# Patient Record
Sex: MALE | Race: WHITE | Employment: FULL TIME | URBAN - METROPOLITAN AREA
[De-identification: names, ages, dates, MRNs, and addresses within clinical notes are randomized per-mention and may not be internally consistent; named-entity substitution may affect disease eponyms.]

---

## 2020-09-13 ENCOUNTER — HOSPITAL ENCOUNTER (INPATIENT)
Age: 55
LOS: 1 days | Discharge: HOME | DRG: 066 | End: 2020-09-14
Attending: EMERGENCY MEDICINE | Admitting: INTERNAL MEDICINE
Payer: COMMERCIAL

## 2020-09-13 ENCOUNTER — APPOINTMENT (OUTPATIENT)
Dept: CT IMAGING | Age: 55
DRG: 066 | End: 2020-09-13
Payer: COMMERCIAL

## 2020-09-13 PROBLEM — I63.9 ACUTE CVA (CEREBROVASCULAR ACCIDENT) (HCC): Status: ACTIVE | Noted: 2020-09-13

## 2020-09-13 LAB
ABSOLUTE EOS #: 0.2 K/UL (ref 0–0.4)
ABSOLUTE IMMATURE GRANULOCYTE: ABNORMAL K/UL (ref 0–0.3)
ABSOLUTE LYMPH #: 2.5 K/UL (ref 1–4.8)
ABSOLUTE MONO #: 0.8 K/UL (ref 0.1–1.3)
ALBUMIN SERPL-MCNC: 4.2 G/DL (ref 3.5–5.2)
ALBUMIN/GLOBULIN RATIO: ABNORMAL (ref 1–2.5)
ALP BLD-CCNC: 57 U/L (ref 40–129)
ALT SERPL-CCNC: 35 U/L (ref 5–41)
ANION GAP SERPL CALCULATED.3IONS-SCNC: 9 MMOL/L (ref 9–17)
AST SERPL-CCNC: 22 U/L
BASOPHILS # BLD: 1 % (ref 0–2)
BASOPHILS ABSOLUTE: 0 K/UL (ref 0–0.2)
BILIRUB SERPL-MCNC: 0.77 MG/DL (ref 0.3–1.2)
BUN BLDV-MCNC: 22 MG/DL (ref 6–20)
BUN/CREAT BLD: ABNORMAL (ref 9–20)
CALCIUM SERPL-MCNC: 9.6 MG/DL (ref 8.6–10.4)
CHLORIDE BLD-SCNC: 107 MMOL/L (ref 98–107)
CO2: 23 MMOL/L (ref 20–31)
CREAT SERPL-MCNC: 0.8 MG/DL (ref 0.7–1.2)
DIFFERENTIAL TYPE: ABNORMAL
EOSINOPHILS RELATIVE PERCENT: 2 % (ref 0–4)
ESTIMATED AVERAGE GLUCOSE: 123 MG/DL
GFR AFRICAN AMERICAN: >60 ML/MIN
GFR NON-AFRICAN AMERICAN: >60 ML/MIN
GFR NON-AFRICAN AMERICAN: >60 ML/MIN
GFR SERPL CREATININE-BSD FRML MDRD: >60 ML/MIN
GFR SERPL CREATININE-BSD FRML MDRD: ABNORMAL ML/MIN/{1.73_M2}
GFR SERPL CREATININE-BSD FRML MDRD: ABNORMAL ML/MIN/{1.73_M2}
GFR SERPL CREATININE-BSD FRML MDRD: NORMAL ML/MIN/{1.73_M2}
GLUCOSE BLD-MCNC: 103 MG/DL
GLUCOSE BLD-MCNC: 103 MG/DL
GLUCOSE BLD-MCNC: 103 MG/DL (ref 75–110)
GLUCOSE BLD-MCNC: 109 MG/DL (ref 70–99)
HBA1C MFR BLD: 5.9 % (ref 4–6)
HCT VFR BLD CALC: 46.9 % (ref 41–53)
HEMOGLOBIN: 16.7 G/DL (ref 13.5–17.5)
IMMATURE GRANULOCYTES: ABNORMAL %
INR BLD: 1
LYMPHOCYTES # BLD: 31 % (ref 24–44)
MCH RBC QN AUTO: 31.1 PG (ref 26–34)
MCHC RBC AUTO-ENTMCNC: 35.5 G/DL (ref 31–37)
MCV RBC AUTO: 87.6 FL (ref 80–100)
MONOCYTES # BLD: 9 % (ref 1–7)
NRBC AUTOMATED: ABNORMAL PER 100 WBC
PDW BLD-RTO: 14.2 % (ref 11.5–14.9)
PLATELET # BLD: 168 K/UL (ref 150–450)
PLATELET ESTIMATE: ABNORMAL
PMV BLD AUTO: 9.2 FL (ref 6–12)
POC CREATININE: 0.91 MG/DL (ref 0.51–1.19)
POTASSIUM SERPL-SCNC: 4.1 MMOL/L (ref 3.7–5.3)
PROTHROMBIN TIME: 13.4 SEC (ref 11.8–14.6)
RBC # BLD: 5.35 M/UL (ref 4.5–5.9)
RBC # BLD: ABNORMAL 10*6/UL
SEG NEUTROPHILS: 57 % (ref 36–66)
SEGMENTED NEUTROPHILS ABSOLUTE COUNT: 4.7 K/UL (ref 1.3–9.1)
SODIUM BLD-SCNC: 139 MMOL/L (ref 135–144)
TOTAL PROTEIN: 7.3 G/DL (ref 6.4–8.3)
TROPONIN INTERP: NORMAL
TROPONIN T: NORMAL NG/ML
TROPONIN, HIGH SENSITIVITY: 9 NG/L (ref 0–22)
WBC # BLD: 8.2 K/UL (ref 3.5–11)
WBC # BLD: ABNORMAL 10*3/UL

## 2020-09-13 PROCEDURE — 2060000000 HC ICU INTERMEDIATE R&B

## 2020-09-13 PROCEDURE — 99222 1ST HOSP IP/OBS MODERATE 55: CPT | Performed by: PSYCHIATRY & NEUROLOGY

## 2020-09-13 PROCEDURE — 70450 CT HEAD/BRAIN W/O DYE: CPT

## 2020-09-13 PROCEDURE — 99254 IP/OBS CNSLTJ NEW/EST MOD 60: CPT | Performed by: PSYCHIATRY & NEUROLOGY

## 2020-09-13 PROCEDURE — 2580000003 HC RX 258: Performed by: INTERNAL MEDICINE

## 2020-09-13 PROCEDURE — 85610 PROTHROMBIN TIME: CPT

## 2020-09-13 PROCEDURE — 99223 1ST HOSP IP/OBS HIGH 75: CPT | Performed by: INTERNAL MEDICINE

## 2020-09-13 PROCEDURE — 85025 COMPLETE CBC W/AUTO DIFF WBC: CPT

## 2020-09-13 PROCEDURE — 83036 HEMOGLOBIN GLYCOSYLATED A1C: CPT

## 2020-09-13 PROCEDURE — 84484 ASSAY OF TROPONIN QUANT: CPT

## 2020-09-13 PROCEDURE — 6360000002 HC RX W HCPCS: Performed by: INTERNAL MEDICINE

## 2020-09-13 PROCEDURE — 99285 EMERGENCY DEPT VISIT HI MDM: CPT

## 2020-09-13 PROCEDURE — 36415 COLL VENOUS BLD VENIPUNCTURE: CPT

## 2020-09-13 PROCEDURE — 82947 ASSAY GLUCOSE BLOOD QUANT: CPT

## 2020-09-13 PROCEDURE — 80053 COMPREHEN METABOLIC PANEL: CPT

## 2020-09-13 PROCEDURE — 6360000004 HC RX CONTRAST MEDICATION: Performed by: EMERGENCY MEDICINE

## 2020-09-13 PROCEDURE — 93005 ELECTROCARDIOGRAM TRACING: CPT | Performed by: EMERGENCY MEDICINE

## 2020-09-13 PROCEDURE — 2580000003 HC RX 258: Performed by: EMERGENCY MEDICINE

## 2020-09-13 PROCEDURE — 70496 CT ANGIOGRAPHY HEAD: CPT

## 2020-09-13 PROCEDURE — 6370000000 HC RX 637 (ALT 250 FOR IP): Performed by: EMERGENCY MEDICINE

## 2020-09-13 PROCEDURE — 82565 ASSAY OF CREATININE: CPT

## 2020-09-13 RX ORDER — SODIUM CHLORIDE 0.9 % (FLUSH) 0.9 %
10 SYRINGE (ML) INJECTION EVERY 12 HOURS SCHEDULED
Status: DISCONTINUED | OUTPATIENT
Start: 2020-09-13 | End: 2020-09-14 | Stop reason: HOSPADM

## 2020-09-13 RX ORDER — ACETAMINOPHEN 325 MG/1
650 TABLET ORAL EVERY 4 HOURS PRN
Status: DISCONTINUED | OUTPATIENT
Start: 2020-09-13 | End: 2020-09-14 | Stop reason: HOSPADM

## 2020-09-13 RX ORDER — SODIUM CHLORIDE 0.9 % (FLUSH) 0.9 %
10 SYRINGE (ML) INJECTION PRN
Status: DISCONTINUED | OUTPATIENT
Start: 2020-09-13 | End: 2020-09-14 | Stop reason: HOSPADM

## 2020-09-13 RX ORDER — ATORVASTATIN CALCIUM 40 MG/1
40 TABLET, FILM COATED ORAL NIGHTLY
Status: DISCONTINUED | OUTPATIENT
Start: 2020-09-14 | End: 2020-09-14 | Stop reason: HOSPADM

## 2020-09-13 RX ORDER — CLOPIDOGREL BISULFATE 75 MG/1
300 TABLET ORAL ONCE
Status: COMPLETED | OUTPATIENT
Start: 2020-09-13 | End: 2020-09-13

## 2020-09-13 RX ORDER — CLOPIDOGREL BISULFATE 75 MG/1
75 TABLET ORAL DAILY
Status: DISCONTINUED | OUTPATIENT
Start: 2020-09-14 | End: 2020-09-14 | Stop reason: HOSPADM

## 2020-09-13 RX ORDER — SODIUM CHLORIDE 9 MG/ML
INJECTION, SOLUTION INTRAVENOUS CONTINUOUS
Status: DISCONTINUED | OUTPATIENT
Start: 2020-09-13 | End: 2020-09-14 | Stop reason: HOSPADM

## 2020-09-13 RX ORDER — ATORVASTATIN CALCIUM 80 MG/1
80 TABLET, FILM COATED ORAL ONCE
Status: COMPLETED | OUTPATIENT
Start: 2020-09-13 | End: 2020-09-13

## 2020-09-13 RX ORDER — 0.9 % SODIUM CHLORIDE 0.9 %
1000 INTRAVENOUS SOLUTION INTRAVENOUS ONCE
Status: COMPLETED | OUTPATIENT
Start: 2020-09-13 | End: 2020-09-13

## 2020-09-13 RX ORDER — ASPIRIN 81 MG/1
324 TABLET, CHEWABLE ORAL ONCE
Status: COMPLETED | OUTPATIENT
Start: 2020-09-13 | End: 2020-09-13

## 2020-09-13 RX ORDER — 0.9 % SODIUM CHLORIDE 0.9 %
80 INTRAVENOUS SOLUTION INTRAVENOUS ONCE
Status: COMPLETED | OUTPATIENT
Start: 2020-09-13 | End: 2020-09-13

## 2020-09-13 RX ADMIN — Medication 10 ML: at 11:22

## 2020-09-13 RX ADMIN — CLOPIDOGREL BISULFATE 300 MG: 75 TABLET ORAL at 09:49

## 2020-09-13 RX ADMIN — IOVERSOL 75 ML: 741 INJECTION INTRA-ARTERIAL; INTRAVENOUS at 09:15

## 2020-09-13 RX ADMIN — ENOXAPARIN SODIUM 30 MG: 30 INJECTION SUBCUTANEOUS at 21:51

## 2020-09-13 RX ADMIN — SODIUM CHLORIDE: 9 INJECTION, SOLUTION INTRAVENOUS at 22:03

## 2020-09-13 RX ADMIN — ASPIRIN 324 MG: 81 TABLET, CHEWABLE ORAL at 09:53

## 2020-09-13 RX ADMIN — SODIUM CHLORIDE 1000 ML: 9 INJECTION, SOLUTION INTRAVENOUS at 09:54

## 2020-09-13 RX ADMIN — ATORVASTATIN CALCIUM 80 MG: 80 TABLET, FILM COATED ORAL at 09:49

## 2020-09-13 RX ADMIN — ENOXAPARIN SODIUM 30 MG: 30 INJECTION SUBCUTANEOUS at 11:22

## 2020-09-13 RX ADMIN — SODIUM CHLORIDE: 9 INJECTION, SOLUTION INTRAVENOUS at 11:22

## 2020-09-13 RX ADMIN — SODIUM CHLORIDE 80 ML: 9 INJECTION, SOLUTION INTRAVENOUS at 09:15

## 2020-09-13 ASSESSMENT — ENCOUNTER SYMPTOMS
CHEST TIGHTNESS: 0
EYE PAIN: 0
CONSTIPATION: 0
FACIAL SWELLING: 0
DIARRHEA: 0
SHORTNESS OF BREATH: 0
WHEEZING: 0
COLOR CHANGE: 0
SINUS PRESSURE: 0
NAUSEA: 0
VOMITING: 0
BLOOD IN STOOL: 0
EYE REDNESS: 0
ABDOMINAL PAIN: 0
COUGH: 0
BACK PAIN: 0
EYE DISCHARGE: 0
SORE THROAT: 0
TROUBLE SWALLOWING: 0
RHINORRHEA: 0

## 2020-09-13 ASSESSMENT — PAIN DESCRIPTION - PAIN TYPE: TYPE: ACUTE PAIN

## 2020-09-13 ASSESSMENT — PAIN SCALES - GENERAL
PAINLEVEL_OUTOF10: 0
PAINLEVEL_OUTOF10: 3

## 2020-09-13 ASSESSMENT — PAIN DESCRIPTION - LOCATION: LOCATION: NECK

## 2020-09-13 NOTE — LETTER
[address]  Phone: [phone]    No name on file. September 14, 2020     Patient: Primitivo Dunham   YOB: 1965   Date of Visit: 9/13/2020       To Whom It May Concern: It is my medical opinion that Primitivo Dunham may return to work on 9/15/2020 with light duty until 9/16/2020. Pt may return to full duty on 9/17/2020.     If you have any questions or concerns, please don't hesitate to call [phone]    Sincerely,    Dr. Lau Reef

## 2020-09-13 NOTE — ED NOTES
Patient returned from [address] on stretcher on monitor with this RN.  Telestroke initiated with Dr. Jhony Jaeger, NAM  09/13/20 5613

## 2020-09-13 NOTE — CONSULTS
[facility], [address], [address] [place] [address]  Ph: [phone] or [phone]  FAX: [phone]        Reason for consult: Possible stroke    I had the pleasure of seeing your patient in neurology consultation for his symptoms. As you would recall Benito Lazcano is a 63 y.o. yo male admitted on 9/13/2020. The patient was at his baseline when he went to sleep on 9/12/2020 around 10 PM, he woke up around 5:30 AM and while going to the bathroom, he noticed that his gait imbalance then he has to walk right steppage to prevent falling. He did not have any focal weakness, no dizziness, no diplopia, no nausea vomiting. He came to the hospital as a stroke alert and underwent CT scan of the head that showed left cerebellar hypodensity suggestive for subacute stroke. CT angiogram head neck was negative. The patient denies having any. History of stroke or TIA. On outpatient basis, he does not take any antiplatelet medications. He has a history of \"white coat hypertension\" however does not take any medications. The patient also has been told that he has borderline hyperlipidemia but does not take a medication for that. History reviewed. No pertinent past medical history.   Past Surgical History:   Procedure Laterality Date    BACK SURGERY       Social History     Socioeconomic History    Marital status: Single     Spouse name: Not on file    Number of children: Not on file    Years of education: Not on file    Highest education level: Not on file   Occupational History    Not on file   Social Needs    Financial resource strain: Not on file    Food insecurity     Worry: Not on file     Inability: Not on file    Transportation needs     Medical: Not on file     Non-medical: Not on file   Tobacco Use    Smoking status: Never Smoker    Smokeless tobacco: Never Used   Substance and Sexual Activity    Alcohol use: Not Currently    Drug use: Never    Sexual intact memory with no confusion, speech or language problems; no hallucinations or delusions     Cranial nerves   II - visual fields intact to confrontation                                                III, IV, VI - extra-ocular muscles full: no pupillary defect; no ELI, no nystagmus, no ptosis                                                                      V - normal facial sensation                                                               VII - normal facial symmetry                                                             VIII - intact hearing                                                                             IX, X - symmetrical palate                                                                  XI - symmetrical shoulder shrug                                                       XII - midline tongue without atrophy or fasciculation     Motor function  Normal muscle bulk and tone; normal power 5/5, including fine motor movements     Sensory function Intact to touch, pin, vibration, proprioception     Cerebellar Intact fine motor movement. No involuntary movements or tremors     Reflex function Intact 2+ DTR and symmetric. Negative Babinski     Gait                  Not tested         No results found for: LDLCALC, LDLCHOLESTEROL, LDLDIRECT  No components found for: CHLPL  No results found for: TRIG  No results found for: HDL  No results found for: LDLCALC  No results found for: LABVLDL  No results found for: LABA1C  No results found for: EAG  No results found for: [id number]   Neurological work up:  CT head    CTA head and neck  MRI brain   2 D echo     Assessment and Recommendations:   Possible left cerebellar subacute ischemic stroke    I agree with continued aspirin along with Plavix 75 mg a day for 3 weeks and then long-term aspirin 81 mg a day thereafter. Continue Lipitor 40 mg p.o. nightly  MRI scan of the brain and 2D echo.   If 2D echo is negative, and MRI of the brain confirms ischemic stroke, the patient would benefit from BRY and possibly loop recorder if BRY is negative  PT OT evaluation  We will follow. Thank you for the consult. Janusz Ulrich MD  Neurology    This note is created with the assistance of a speech-recognition program. While intending to generate a document that actually reflects the content of the visit, the document can still have some errors including those of syntax and sound a- like substitutions which may escape proofreading. In such instances, actual meaning can be extrapolated by contextual derivation.

## 2020-09-13 NOTE — ED NOTES
Mode of arrival (squad #, walk in, police, etc) : Walk In        Chief complaint(s): Balance problem, neck pain        Arrival Note (brief scenario, treatment PTA, etc). : Pt arrives to ED c/o \"feeling off balance. \" Patient states that he woke up this morning and felt like he was having difficulty walking due to feeling off balance. Patient states that he went to be at 2200 last night and felt fine and states that when he woke up around 0515 this morning he had symptoms. Patient states that he does have numbness to his left leg that is not new buts states that he has had that since he had his 3 back surgeries. Patient states that he also has some pain to the base of his neck. Patient is placed on cardiac monitor and continuous pulse oximetry. Patient is resting on stretcher with no s/s of distress. C= \"Have you ever felt that you should Cut down on your drinking? \"  No  A= \"Have people Annoyed you by criticizing your drinking? \"  No  G= \"Have you ever felt bad or Guilty about your drinking? \"  No  E= \"Have you ever had a drink as an Eye-opener first thing in the morning to steady your nerves or to help a hangover? \"  No      Deferred []      Reason for deferring: N/A    *If yes to two or more: probable alcohol abuse. Everett Ortega RN  09/13/20 8817

## 2020-09-13 NOTE — PROGRESS NOTES
Please have the patient, or the patient representative, fill out the MRI Screening form and fax to dept @ [phone]. Any questions. .please call [facility] @ [phone]. MRI exam will be scheduled after receiving the completed screening form.  Thank you!!

## 2020-09-13 NOTE — H&P
alcohol use. Drug Use:  reports no history of drug use. Family History:     History reviewed. No pertinent family history. Physical Exam:   BP (!) 144/97   Pulse 50   Temp 98.1 °F (36.7 °C) (Oral)   Resp 18   Ht 6' 4\" (1.93 m)   Wt 291 lb 0.1 oz (132 kg)   SpO2 98%   BMI 35.42 kg/m²   Recent Labs     09/13/20  0913   POCGLU 103       General Appearance:  alert, well appearing, and in no acute distress  Mental status: oriented to person, place, and time with normal affect  Head:  normocephalic, atraumatic. Eye: no icterus, redness, pupils equal and reactive, extraocular eye movements intact, conjunctiva clear  Ear: normal external ear, no discharge, hearing intact  Nose:  no drainage noted  Mouth: mucous membranes moist  Neck: supple, no carotid bruits, thyroid not palpable  Lungs: Bilateral equal air entry, clear to ausculation, no wheezing, rales or rhonchi, normal effort  Cardiovascular: normal rate, regular rhythm, no murmur, gallop, rub. Abdomen: Soft, nontender, nondistended, normal bowel sounds, no hepatomegaly or splenomegaly  Neurologic: Patient walks with wide-based gait, Romberg is positive. Also left leg numbness, chronic since back surgery 10 years ago.   There are no other focal motor or sensory deficits, normal muscle tone and bulk, no abnormal sensation, normal speech, cranial nerves II through XII grossly intact  Skin: No gross lesions, rashes, bruising or bleeding on exposed skin area  Extremities:  peripheral pulses palpable, no pedal edema or calf pain with palpation  Psych: normal affect     Investigations:      Laboratory Testing:  Recent Results (from the past 24 hour(s))   CBC Auto Differential    Collection Time: 09/13/20  8:49 AM   Result Value Ref Range    WBC 8.2 3.5 - 11.0 k/uL    RBC 5.35 4.5 - 5.9 m/uL    Hemoglobin 16.7 13.5 - 17.5 g/dL    Hematocrit 46.9 41 - 53 %    MCV 87.6 80 - 100 fL    MCH 31.1 26 - 34 pg    MCHC 35.5 31 - 37 g/dL    RDW 14.2 11.5 - 14.9 % Platelets 144 823 - 633 k/uL    MPV 9.2 6.0 - 12.0 fL    NRBC Automated NOT REPORTED per 100 WBC    Differential Type NOT REPORTED     Immature Granulocytes NOT REPORTED 0 %    Absolute Immature Granulocyte NOT REPORTED 0.00 - 0.30 k/uL    WBC Morphology NOT REPORTED     RBC Morphology NOT REPORTED     Platelet Estimate NOT REPORTED     Seg Neutrophils 57 36 - 66 %    Lymphocytes 31 24 - 44 %    Monocytes 9 (H) 1 - 7 %    Eosinophils % 2 0 - 4 %    Basophils 1 0 - 2 %    Segs Absolute 4.70 1.3 - 9.1 k/uL    Absolute Lymph # 2.50 1.0 - 4.8 k/uL    Absolute Mono # 0.80 0.1 - 1.3 k/uL    Absolute Eos # 0.20 0.0 - 0.4 k/uL    Basophils Absolute 0.00 0.0 - 0.2 k/uL   Comprehensive Metabolic Panel w/ Reflex to MG    Collection Time: 09/13/20  8:49 AM   Result Value Ref Range    Glucose 109 (H) 70 - 99 mg/dL    BUN 22 (H) 6 - 20 mg/dL    CREATININE 0.80 0.70 - 1.20 mg/dL    Bun/Cre Ratio NOT REPORTED 9 - 20    Calcium 9.6 8.6 - 10.4 mg/dL    Sodium 139 135 - 144 mmol/L    Potassium 4.1 3.7 - 5.3 mmol/L    Chloride 107 98 - 107 mmol/L    CO2 23 20 - 31 mmol/L    Anion Gap 9 9 - 17 mmol/L    Alkaline Phosphatase 57 40 - 129 U/L    ALT 35 5 - 41 U/L    AST 22 <40 U/L    Total Bilirubin 0.77 0.3 - 1.2 mg/dL    Total Protein 7.3 6.4 - 8.3 g/dL    Alb 4.2 3.5 - 5.2 g/dL    Albumin/Globulin Ratio NOT REPORTED 1.0 - 2.5    GFR Non-African American >60 >60 mL/min    GFR African American >60 >60 mL/min    GFR Comment          GFR Staging NOT REPORTED    Troponin    Collection Time: 09/13/20  8:49 AM   Result Value Ref Range    Troponin, High Sensitivity 9 0 - 22 ng/L    Troponin T NOT REPORTED <0.03 ng/mL    Troponin Interp NOT REPORTED    Protime-INR    Collection Time: 09/13/20  8:49 AM   Result Value Ref Range    Protime 13.4 11.8 - 14.6 sec    INR 1.0    Creatinine W/GFR Point of Care    Collection Time: 09/13/20  8:53 AM   Result Value Ref Range    POC Creatinine 0.91 0.51 - 1.19 mg/dL    GFR Comment >60 >60 mL/min    GFR Non-African American >60 >60 mL/min    GFR Comment         POC Glucose Fingerstick    Collection Time: 09/13/20  9:13 AM   Result Value Ref Range    POC Glucose 103 75 - 110 mg/dL   POCT Glucose    Collection Time: 09/13/20  9:19 AM   Result Value Ref Range    Glucose 103 mg/dL   POCT Glucose    Collection Time: 09/13/20  9:19 AM   Result Value Ref Range    Glucose 103 mg/dL   EKG 12 Lead    Collection Time: 09/13/20  9:29 AM   Result Value Ref Range    Ventricular Rate 50 BPM    Atrial Rate 50 BPM    P-R Interval 166 ms    QRS Duration 96 ms    Q-T Interval 468 ms    QTc Calculation (Bazett) 426 ms    P Axis 32 degrees    R Axis -5 degrees    T Axis -9 degrees       Recent Labs     09/13/20 0919 09/13/20 0853 09/13/20 0849   HGB  --   --  16.7   HCT  --   --  46.9   WBC  --   --  8.2   MCV  --   --  87.6   NA  --   --  139   K  --   --  4.1   CL  --   --  107   CO2  --   --  23   BUN  --   --  22*   CREATININE  --  0.91 0.80   GLUCOSE 103  103  --  109*   INR  --   --  1.0   PROTIME  --   --  13.4   AST  --   --  22   ALT  --   --  35   LABALBU  --   --  4.2       Hematology:  Recent Labs     09/13/20 0849   WBC 8.2   RBC 5.35   HGB 16.7   HCT 46.9   MCV 87.6   MCH 31.1   MCHC 35.5   RDW 14.2      MPV 9.2   INR 1.0     Chemistry:  Recent Labs     09/13/20 0849 09/13/20 0853 09/13/20 0919     --   --    K 4.1  --   --      --   --    CO2 23  --   --    GLUCOSE 109*  --  103  103   BUN 22*  --   --    CREATININE 0.80 0.91  --    ANIONGAP 9  --   --    LABGLOM >60 >60  --    GFRAA >60  --   --    CALCIUM 9.6  --   --    TROPONINT NOT REPORTED  --   --      Recent Labs     09/13/20 0849 09/13/20 0913   PROT 7.3  --    LABALBU 4.2  --    AST 22  --    ALT 35  --    ALKPHOS 57  --    BILITOT 0.77  --    POCGLU  --  103       Imaging/Diagnostics:       Ct Head Wo Contrast    Result Date: 9/13/2020  EXAMINATION: CT OF THE HEAD WITHOUT CONTRAST  9/13/2020 8:58 am TECHNIQUE: CT of the head was performed without the administration of intravenous contrast. Dose modulation, iterative reconstruction, and/or weight based adjustment of the mA/kV was utilized to reduce the radiation dose to as low as reasonably achievable. COMPARISON: None. HISTORY: ORDERING SYSTEM PROVIDED HISTORY: Ataxia TECHNOLOGIST PROVIDED HISTORY: Ataxia Reason for Exam: ataxia Acuity: Acute Additional signs and symptoms: Pt woke up dizzy this morning; fine when went to bed last night Relevant Medical/Surgical History: no surgery to area of interest FINDINGS: BRAIN/VENTRICLES: There is no acute intracranial hemorrhage, mass effect, or midline shift. There is a hypodense focus in the left cerebellar hemisphere measuring 1.3 x 1.1 cm. There is a smaller hypodense focus in the superior aspect of the left cerebellar hemisphere measuring 6 mm. The ventricular structures are symmetric and unremarkable. ORBITS: The visualized portion of the orbits demonstrate no acute abnormality. SINUSES: The visualized paranasal sinuses and mastoid air cells demonstrate no acute abnormality. SOFT TISSUES/SKULL:  No acute abnormality of the visualized skull or soft tissues. Hypodense foci in the left cerebellar hemisphere as described above that may represent areas of acute/subacute ischemia. Correlation with MRI with diffusion-weighted imaging is recommended for further characterization. Findings were discussed with Dr. Salud Robertson At 9:14 am on 9/13/2020. Cta Head Neck W Contrast    Addendum Date: 9/13/2020    ADDENDUM: 3D reconstructed images were performed on a separate workstation and provided for review. Result Date: 9/13/2020  EXAMINATION: CTA OF THE HEAD AND NECK WITH CONTRAST 9/13/2020 9:06 am: TECHNIQUE: CTA of the head and neck was performed with the administration of intravenous contrast. Multiplanar reformatted images are provided for review. MIP images are provided for review.  Stenosis of the internal carotid arteries measured using NASCET criteria. Dose modulation, iterative reconstruction, and/or weight based adjustment of the mA/kV was utilized to reduce the radiation dose to as low as reasonably achievable. COMPARISON: None. HISTORY: ORDERING SYSTEM PROVIDED HISTORY: Ataxia TECHNOLOGIST PROVIDED HISTORY: Ataxia Reason for Exam: ataxia Acuity: Acute Type of Exam: Initial Additional signs and symptoms: Pt woke up dizzy this morning Relevant Medical/Surgical History: no surgery to area of interest FINDINGS: CTA NECK: AORTIC ARCH/ARCH VESSELS: No dissection or arterial injury. No significant stenosis of the brachiocephalic or subclavian arteries. CAROTID ARTERIES: No dissection, arterial injury, or hemodynamically significant stenosis by NASCET criteria. VERTEBRAL ARTERIES: No dissection, arterial injury, or significant stenosis. SOFT TISSUES: The lung apices are clear. No cervical or superior mediastinal lymphadenopathy. The larynx and pharynx are unremarkable. No acute abnormality of the salivary and thyroid glands. BONES: No acute osseous abnormality. CTA HEAD: ANTERIOR CIRCULATION: No significant stenosis of the intracranial internal carotid, anterior cerebral, or middle cerebral arteries. No aneurysm. POSTERIOR CIRCULATION: No significant stenosis of the vertebral, basilar, or posterior cerebral arteries. No aneurysm. OTHER: No dural venous sinus thrombosis on this non-dedicated study. BRAIN: No mass effect or midline shift. No extra-axial fluid collection. The gray-white differentiation is maintained. Unremarkable CTA of the head and neck. Findings were discussed with Dr. Munir Francois At 9:24 am on 9/13/2020.         Current Facility-Administered Medications   Medication Dose Route Frequency Provider Last Rate Last Dose    sodium chloride flush 0.9 % injection 10 mL  10 mL Intravenous QUITA Hastings MD        sodium chloride flush 0.9 % injection 10 mL  10 mL Intravenous 2 times per day Oumar Jaimes MD        sodium chloride flush 0.9 % injection 10 mL  10 mL Intravenous PRN Catie Garcia MD        acetaminophen (TYLENOL) tablet 650 mg  650 mg Oral Q4H PRN Catie Garcia MD        enoxaparin (LOVENOX) injection 30 mg  30 mg Subcutaneous BID Catie Garcia MD        0.9 % sodium chloride infusion   Intravenous Continuous Catie Garcia MD           Impressions :     1. Active Problems:    Acute CVA (cerebrovascular accident) [facility])  Resolved Problems:    * No resolved hospital problems. *        2.  has no past medical history on file. Plans:     1. Acute CVA of left cerebellum, MRI ordered, neuro consulted. Loaded with aspirin, Plavix, will continue. Echocardiogram ordered; patient has history of? PFO/has coils in pulmonary artery.   2. PT/OT, check HbA1c, lipid profile, start Lipitor      DVT pplx-Lovenox      Catie Garcia MD  9/13/2020  10:56 AM

## 2020-09-13 NOTE — VIRTUAL HEALTH
club or organization: Not on file     Attends meetings of clubs or organizations: Not on file     Relationship status: Not on file    Intimate partner violence     Fear of current or ex partner: Not on file     Emotionally abused: Not on file     Physically abused: Not on file     Forced sexual activity: Not on file   Other Topics Concern    Not on file   Social History Narrative    Not on file     Family History  History reviewed. No pertinent family history. OBJECTIVE  Vitals:    20 0846   BP: (!) 153/91   Pulse: 62   Resp: 18   Temp: 98.1 °F (36.7 °C)   SpO2: 96%        General:  Gen: obese habitus, NAD  HEENT: NCAT, mucosa moist  Cvs: RRR, S1 S2 normal  Resp: symmetric unlabored breathing  Abd: s/nd/nt  Ext: no edema  Skin: no lesions seen, warm and dry    Neuro:  Gen: awake and alert, oriented x3. Lang/speech: no aphasia or dysarthria. Follows commands. CN: PERRL, EOMI, VFF, V1-3 intact, face symmetric, hearing intact, shoulder shrug symmetric, tongue midline  Motor: grossly 5/5 UE and LE b/l  Sense: LLE numbness (chronic, following back surgery)  Coord: FTN and HTS intact b/l  DTR: deferred  Gait: wide base unsteady gait, leans to the left. Unable to tandem. NIH Stroke Scale:   1a  Level of consciousness: 0 - alert; keenly responsive   1b. LOC questions:  0 - answers both questions correctly   1c. LOC commands: 0 - performs both tasks correctly   2. Best Gaze: 0 - normal   3. Visual: 0 - no visual loss   4. Facial Palsy: 0 - normal symmetric movement   5a. Motor left arm: 0 - no drift, limb holds 90 (or 45) degrees for full 10 seconds   5b. Motor right arm: 0 - no drift, limb holds 90 (or 45) degrees for full 10 seconds   6a. Motor left le - no drift; leg holds 30 degree position for full 5 seconds   6b  Motor right le - no drift; leg holds 30 degree position for full 5 seconds   7. Limb Ataxia: 0 - absent   8. Sensory: 1   9. Best Language:  0 - no aphasia, normal   10.

## 2020-09-13 NOTE — ED NOTES
Telestroke completed at this time. Dr. Christian Saldana at bedside with patient.       Alessandro Hyde RN  09/13/20 4829

## 2020-09-13 NOTE — PROGRESS NOTES
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 \" Patient states that he has had multiple MRIs performed since the metal has been in place and they just need to [facility]ESS [facility] ease me in and out of the MRI. \" It was explained due to the nature of the magnet, the equipment, and the radiologist that all of this information would be relayed and it would ultimately be up to the radiologist if he would like to perform the MRI or not. Patient knows that the MRI will not happen until tomorrow, 09/14/2020, at this time regardless.

## 2020-09-13 NOTE — ED PROVIDER NOTES
[address]  eMERGENCY dEPARTMENT eNCOUnter      Pt Name: Catie Ram  MRN: [id number]  [place] 1965  Date of evaluation: 9/13/20      CHIEF COMPLAINT       Chief Complaint   Patient presents with    Other     balance problem    Neck Pain         HISTORY OF PRESENT ILLNESS    Catie Ram is a 63 y.o. male who presents complaining of difficulty walking. Patient states he woke up this morning and since waking up he has been feeling like he is had an issue with his balance. Patient states that he got up and started walking had to turn a corner to going to the bathroom and fell into the wall. Patient states that after that when he was walking down the [place]en he felt that he Was swaying back and forth. Patient states that now he feels better overall but he still feels like he leans to the left when he walks. Patient denies headache blurry vision numbness tingling or weakness. Patient states she has a little bit of a pain in the back of his neck area. Patient denies any trauma. Patient has no chest pain shortness of breath or abdominal pain. REVIEW OF SYSTEMS       Review of Systems   Constitutional: Negative for activity change, appetite change, chills, diaphoresis and fever. HENT: Negative for congestion, ear pain, facial swelling, nosebleeds, rhinorrhea, sinus pressure, sore throat and trouble swallowing. Eyes: Negative for pain, discharge and redness. Respiratory: Negative for cough, chest tightness, shortness of breath and wheezing. Cardiovascular: Negative for chest pain, palpitations and leg swelling. Gastrointestinal: Negative for abdominal pain, blood in stool, constipation, diarrhea, nausea and vomiting. Genitourinary: Negative for difficulty urinating, dysuria, flank pain, frequency, genital sores and hematuria. Musculoskeletal: Positive for gait problem. Negative for arthralgias, back pain, joint swelling, myalgias and neck pain.    Skin: Negative for color change, pallor, rash and wound. Neurological: Negative for dizziness, tremors, seizures, syncope, speech difficulty, weakness, numbness and headaches. Psychiatric/Behavioral: Negative for confusion, decreased concentration, hallucinations, self-injury, sleep disturbance and suicidal ideas. PAST MEDICAL HISTORY   History reviewed. No pertinent past medical history. SURGICAL HISTORY       Past Surgical History:   Procedure Laterality Date    BACK SURGERY         CURRENT MEDICATIONS       Previous Medications    No medications on file       ALLERGIES     has No Known Allergies. FAMILY HISTORY     [unfilled]     SOCIAL HISTORY      reports that he has never smoked. He has never used smokeless tobacco. He reports previous alcohol use. He reports that he does not use drugs. PHYSICAL EXAM     INITIAL VITALS: /81   Pulse 62   Temp 98.1 °F (36.7 °C) (Oral)   Resp 23   SpO2 95%      Physical Exam  Vitals signs and nursing note reviewed. Constitutional:       General: He is not in acute distress. Appearance: He is well-developed. He is not diaphoretic. HENT:      Head: Normocephalic and atraumatic. Eyes:      General: No scleral icterus. Right eye: No discharge. Left eye: No discharge. Conjunctiva/sclera: Conjunctivae normal.      Pupils: Pupils are equal, round, and reactive to light. Cardiovascular:      Rate and Rhythm: Normal rate and regular rhythm. Heart sounds: Normal heart sounds. No murmur. No friction rub. No gallop. Pulmonary:      Effort: Pulmonary effort is normal. No respiratory distress. Breath sounds: Normal breath sounds. No wheezing or rales. Chest:      Chest wall: No tenderness. Abdominal:      General: Bowel sounds are normal. There is no distension. Palpations: Abdomen is soft. There is no mass. Tenderness: There is no abdominal tenderness. There is no guarding or rebound. Musculoskeletal: Normal range of motion. General: No tenderness. Skin:     General: Skin is warm and dry. Coloration: Skin is not pale. Findings: No erythema or rash. Neurological:      Mental Status: He is alert and oriented to person, place, and time. Cranial Nerves: No cranial nerve deficit. Sensory: No sensory deficit. Motor: No abnormal muscle tone. Coordination: Romberg sign positive. Finger-Nose-Finger Test and Heel to [facility] Test normal.      Gait: Tandem walk abnormal.      Deep Tendon Reflexes: Reflexes normal.   Psychiatric:         Behavior: Behavior normal.         Thought Content: Thought content normal.         Judgment: Judgment normal.         MEDICAL DECISION MAKING:     Patient is having some truncal ataxia and difficulty with walking but otherwise no limb ataxia. Will call stroke alert since he woke up with the symptoms. Patient is having little bit of neck pain so this does make me concerned about the possibility like a vertebral artery dissection. DIAGNOSTIC RESULTS     EKG: All EKG's are interpreted by the Emergency Department Physician who either signs or Co-signs this chart in the absence of a cardiologist.    EKG Interpretation    Interpreted by emergency department physician    Rhythm: sinus bradycardia  Rate: bradycardia  Axis: normal  Ectopy: none  Conduction: normal  ST Segments: normal  T Waves: normal  Q Waves: none    Clinical Impression: EKG: sinus bradycardia. [facility]        RADIOLOGY:All plain film, CT, MRI, and formal ultrasound images (except ED bedside ultrasound)are read by the radiologist and interpretations are directly viewed by the emergency physician.   Ct Head Wo Contrast    Result Date: 9/13/2020  EXAMINATION: CT OF THE HEAD WITHOUT CONTRAST  9/13/2020 8:58 am TECHNIQUE: CT of the head was performed without the administration of intravenous contrast. Dose modulation, iterative reconstruction, and/or weight based adjustment of the mA/kV was utilized to reduce the radiation dose to as low as reasonably achievable. COMPARISON: None. HISTORY: ORDERING SYSTEM PROVIDED HISTORY: Ataxia TECHNOLOGIST PROVIDED HISTORY: Ataxia Reason for Exam: ataxia Acuity: Acute Additional signs and symptoms: Pt woke up dizzy this morning; fine when went to bed last night Relevant Medical/Surgical History: no surgery to area of interest FINDINGS: BRAIN/VENTRICLES: There is no acute intracranial hemorrhage, mass effect, or midline shift. There is a hypodense focus in the left cerebellar hemisphere measuring 1.3 x 1.1 cm. There is a smaller hypodense focus in the superior aspect of the left cerebellar hemisphere measuring 6 mm. The ventricular structures are symmetric and unremarkable. ORBITS: The visualized portion of the orbits demonstrate no acute abnormality. SINUSES: The visualized paranasal sinuses and mastoid air cells demonstrate no acute abnormality. SOFT TISSUES/SKULL:  No acute abnormality of the visualized skull or soft tissues. Hypodense foci in the left cerebellar hemisphere as described above that may represent areas of acute/subacute ischemia. Correlation with MRI with diffusion-weighted imaging is recommended for further characterization. Findings were discussed with Dr. Jeronimo Valencia At 9:14 am on 9/13/2020. Cta Head Neck W Contrast    Result Date: 9/13/2020  EXAMINATION: CTA OF THE HEAD AND NECK WITH CONTRAST 9/13/2020 9:06 am: TECHNIQUE: CTA of the head and neck was performed with the administration of intravenous contrast. Multiplanar reformatted images are provided for review. MIP images are provided for review. Stenosis of the internal carotid arteries measured using NASCET criteria. Dose modulation, iterative reconstruction, and/or weight based adjustment of the mA/kV was utilized to reduce the radiation dose to as low as reasonably achievable. COMPARISON: None.  HISTORY: ORDERING SYSTEM PROVIDED HISTORY: Ataxia TECHNOLOGIST PROVIDED HISTORY: Ataxia Reason for Exam: ataxia Acuity: Acute Type of Exam: Initial Additional signs and symptoms: Pt woke up dizzy this morning Relevant Medical/Surgical History: no surgery to area of interest FINDINGS: CTA NECK: AORTIC ARCH/ARCH VESSELS: No dissection or arterial injury. No significant stenosis of the brachiocephalic or subclavian arteries. CAROTID ARTERIES: No dissection, arterial injury, or hemodynamically significant stenosis by NASCET criteria. VERTEBRAL ARTERIES: No dissection, arterial injury, or significant stenosis. SOFT TISSUES: The lung apices are clear. No cervical or superior mediastinal lymphadenopathy. The larynx and pharynx are unremarkable. No acute abnormality of the salivary and thyroid glands. BONES: No acute osseous abnormality. CTA HEAD: ANTERIOR CIRCULATION: No significant stenosis of the intracranial internal carotid, anterior cerebral, or middle cerebral arteries. No aneurysm. POSTERIOR CIRCULATION: No significant stenosis of the vertebral, basilar, or posterior cerebral arteries. No aneurysm. OTHER: No dural venous sinus thrombosis on this non-dedicated study. BRAIN: No mass effect or midline shift. No extra-axial fluid collection. The gray-white differentiation is maintained. Unremarkable CTA of the head and neck. Findings were discussed with Dr. Reyes Rayo At 9:24 am on 9/13/2020. LABS: All lab results were reviewed byeneida, and all abnormals are listed below.   Labs Reviewed   CBC WITH AUTO DIFFERENTIAL - Abnormal; Notable for the following components:       Result Value    Monocytes 9 (*)     All other components within normal limits   COMPREHENSIVE METABOLIC PANEL W/ REFLEX TO MG FOR LOW K - Abnormal; Notable for the following components:    Glucose 109 (*)     BUN 22 (*)     All other components within normal limits   POCT GLUCOSE - Normal   TROPONIN   PROTIME-INR   POCT GLUCOSE   CREATININE W/GFR POINT OF CARE   POC GLUCOSE FINGERSTICK         EMERGENCY DEPARTMENT COURSE:   Vitals:    Vitals: 20 0846 20 0930   BP: (!) 153/91 132/81   Pulse: 62 62   Resp: 18 23   Temp: 98.1 °F (36.7 °C)    TempSrc: Oral    SpO2: 96% 95%       The patient was given the following medications while in the emergency department:     Orders Placed This Encounter   Medications    sodium chloride flush 0.9 % injection 10 mL    0.9 % sodium chloride bolus    ioversol (OPTIRAY) 74 % injection 75 mL    aspirin chewable tablet 324 mg    clopidogrel (PLAVIX) tablet 300 mg    atorvastatin (LIPITOR) tablet 80 mg    0.9 % sodium chloride bolus       -------------------------  INITIAL NIH STROKE SCALE    Last known well: Last night when he went to bed    Time Performed:  850 AM    Administer stroke scale items in the order listed. Record performance in each category after each subscale exam. Do not go back and change scores. Follow directions provided for each exam technique. Scores should reflect what the patient does, not what the clinician thinks the patient can do. The clinician should record answers while administering the exam and work quickly. Except where indicated, the patient should not be coached (i.e., repeated requests to patient to make a special effort). 1a.  Level of consciousness:  0 - alert; keenly responsive  1b. Level of consciousness questions:  0 - answers both questions correctly  1c. Level of consciousness questions:  0 - performs both tasks correctly  2. Best Gaze:  0 - normal  3. Visual:  0 - no visual loss  4. Facial Palsy:  0 - normal symmetric movement  5a. Motor left arm:  0 - no drift, limb holds 90 (or 45) degrees for full 10 seconds  5b. Motor right arm:  0 - no drift, limb holds 90 (or 45) degrees for full 10 seconds  6a. Motor left le - no drift; leg holds 30 degree position for full 5 seconds  6b. Motor right le - no drift; leg holds 30 degree position for full 5 seconds  7. Limb Ataxia:  0 - absent  8. Sensory:  0 - normal; no sensory loss  9. Best Language:  0 - no aphasia, normal  10. Dysarthria:  0 - normal  11. Extinction and Inattention:  0 - no abnormality    TOTAL:  0    9:46 AM EDT  Discussed the case with Dr. Eliz Aviles for interventional neurology. He did a bedside stroke assessment with the tele-stroke machine and agrees that the patient probably has had a posterior stroke. He request the patient be admitted for a stroke work-up get high-dose aspirin Plavix load and a statin. I spoke with Dr. Aletha Serra who agrees to admit the patient for the work-up. Residents have been notified. Patient is not a TPA candidate as the patient woke up with the symptoms. Per the interventional list patient is not a mechanical interventional candidate because there is no clot seen on CTA. CRITICAL CARE:   None    CONSULTS:  IP CONSULT TO PHARMACY  PHARMACY TO CHANGE BASE FLUIDS  IP CONSULT TO PRIMARY CARE PROVIDER  IP CONSULT TO NEUROLOGY    PROCEDURES:  None    FINAL IMPRESSION      1. Cerebrovascular accident (CVA), unspecified mechanism ([facility] [address]          [address] Admitted 09/13/2020 09:45:36 AM      PATIENT REFERRED TO:  No follow-up provider specified.     DISCHARGE MEDICATIONS:  New Prescriptions    No medications on file       (Please note that portions of this note were completed with a voice recognition program.  Efforts were made to edit the dictations but occasionally words are mis-transcribed.)    Cyndy Peck MD  Attending Melissa Duran MD  09/13/20 2001

## 2020-09-13 NOTE — LETTER
[address] [address]  Phone: [phone]             September 14, 2020    Patient: Hermelinda Doherty   YOB: 1965   Date of Visit: 9/13/2020       To Whom It May Concern:    Hermelinda Doherty was seen and treated in our facility beginning 9/13/2020 until 9/15/2020 . He may return to work on 09/16/2020.       Sincerely,     [facility]

## 2020-09-13 NOTE — ED NOTES
Admission Dx: Acute CVA    Pts Chief Complaints on Arrival: Balance problem, neck pain    ADL's - Self-care    Pending Diagnostics:  None    Residence PTA: home    Special Considerations/Circumstances:  History of back surgery has numbness and difficulty moving left leg prior to today    Vitals: Current vital signs:  /63   Pulse 53   Temp 97.5 °F (36.4 °C) (Oral)   Resp 16   SpO2 95%                MEWS Score: 1            Joe Bee RN  09/13/20 1005

## 2020-09-14 ENCOUNTER — APPOINTMENT (OUTPATIENT)
Dept: MRI IMAGING | Age: 55
DRG: 066 | End: 2020-09-14
Payer: COMMERCIAL

## 2020-09-14 VITALS
RESPIRATION RATE: 18 BRPM | WEIGHT: 291.01 LBS | OXYGEN SATURATION: 95 % | TEMPERATURE: 98.3 F | DIASTOLIC BLOOD PRESSURE: 84 MMHG | HEART RATE: 48 BPM | SYSTOLIC BLOOD PRESSURE: 132 MMHG | BODY MASS INDEX: 35.44 KG/M2 | HEIGHT: 76 IN

## 2020-09-14 LAB
ANION GAP SERPL CALCULATED.3IONS-SCNC: 6 MMOL/L (ref 9–17)
BUN BLDV-MCNC: 17 MG/DL (ref 6–20)
BUN/CREAT BLD: ABNORMAL (ref 9–20)
CALCIUM SERPL-MCNC: 9.1 MG/DL (ref 8.6–10.4)
CHLORIDE BLD-SCNC: 107 MMOL/L (ref 98–107)
CHOLESTEROL/HDL RATIO: 4.4
CHOLESTEROL: 172 MG/DL
CO2: 25 MMOL/L (ref 20–31)
CREAT SERPL-MCNC: 0.8 MG/DL (ref 0.7–1.2)
EKG ATRIAL RATE: 50 BPM
EKG P AXIS: 32 DEGREES
EKG P-R INTERVAL: 166 MS
EKG Q-T INTERVAL: 468 MS
EKG QRS DURATION: 96 MS
EKG QTC CALCULATION (BAZETT): 426 MS
EKG R AXIS: -5 DEGREES
EKG T AXIS: -9 DEGREES
EKG VENTRICULAR RATE: 50 BPM
GFR AFRICAN AMERICAN: >60 ML/MIN
GFR NON-AFRICAN AMERICAN: >60 ML/MIN
GFR SERPL CREATININE-BSD FRML MDRD: ABNORMAL ML/MIN/{1.73_M2}
GFR SERPL CREATININE-BSD FRML MDRD: ABNORMAL ML/MIN/{1.73_M2}
GLUCOSE BLD-MCNC: 113 MG/DL (ref 70–99)
HCT VFR BLD CALC: 45.4 % (ref 41–53)
HDLC SERPL-MCNC: 39 MG/DL
HEMOGLOBIN: 15.9 G/DL (ref 13.5–17.5)
LDL CHOLESTEROL: 110 MG/DL (ref 0–130)
LV EF: 60 %
LVEF MODALITY: NORMAL
MCH RBC QN AUTO: 31.1 PG (ref 26–34)
MCHC RBC AUTO-ENTMCNC: 35.1 G/DL (ref 31–37)
MCV RBC AUTO: 88.8 FL (ref 80–100)
NRBC AUTOMATED: ABNORMAL PER 100 WBC
PDW BLD-RTO: 14.3 % (ref 11.5–14.9)
PLATELET # BLD: 148 K/UL (ref 150–450)
PMV BLD AUTO: 8.8 FL (ref 6–12)
POTASSIUM SERPL-SCNC: 4.6 MMOL/L (ref 3.7–5.3)
RBC # BLD: 5.11 M/UL (ref 4.5–5.9)
SODIUM BLD-SCNC: 138 MMOL/L (ref 135–144)
TRIGL SERPL-MCNC: 117 MG/DL
TSH SERPL DL<=0.05 MIU/L-ACNC: 1.91 MIU/L (ref 0.3–5)
VLDLC SERPL CALC-MCNC: ABNORMAL MG/DL (ref 1–30)
WBC # BLD: 6.8 K/UL (ref 3.5–11)

## 2020-09-14 PROCEDURE — 6370000000 HC RX 637 (ALT 250 FOR IP): Performed by: INTERNAL MEDICINE

## 2020-09-14 PROCEDURE — 36415 COLL VENOUS BLD VENIPUNCTURE: CPT

## 2020-09-14 PROCEDURE — 97161 PT EVAL LOW COMPLEX 20 MIN: CPT

## 2020-09-14 PROCEDURE — 97165 OT EVAL LOW COMPLEX 30 MIN: CPT

## 2020-09-14 PROCEDURE — 84443 ASSAY THYROID STIM HORMONE: CPT

## 2020-09-14 PROCEDURE — 70551 MRI BRAIN STEM W/O DYE: CPT

## 2020-09-14 PROCEDURE — 80061 LIPID PANEL: CPT

## 2020-09-14 PROCEDURE — 93306 TTE W/DOPPLER COMPLETE: CPT

## 2020-09-14 PROCEDURE — 99232 SBSQ HOSP IP/OBS MODERATE 35: CPT | Performed by: PSYCHIATRY & NEUROLOGY

## 2020-09-14 PROCEDURE — 99239 HOSP IP/OBS DSCHRG MGMT >30: CPT | Performed by: INTERNAL MEDICINE

## 2020-09-14 PROCEDURE — 6360000002 HC RX W HCPCS: Performed by: INTERNAL MEDICINE

## 2020-09-14 PROCEDURE — 93010 ELECTROCARDIOGRAM REPORT: CPT | Performed by: INTERNAL MEDICINE

## 2020-09-14 PROCEDURE — 80048 BASIC METABOLIC PNL TOTAL CA: CPT

## 2020-09-14 PROCEDURE — 85027 COMPLETE CBC AUTOMATED: CPT

## 2020-09-14 RX ORDER — CLOPIDOGREL BISULFATE 75 MG/1
75 TABLET ORAL DAILY
Qty: 21 TABLET | Refills: 0 | Status: SHIPPED | OUTPATIENT
Start: 2020-09-15 | End: 2020-10-06

## 2020-09-14 RX ORDER — ATORVASTATIN CALCIUM 40 MG/1
40 TABLET, FILM COATED ORAL NIGHTLY
Qty: 30 TABLET | Refills: 0 | Status: SHIPPED | OUTPATIENT
Start: 2020-09-14

## 2020-09-14 RX ADMIN — ASPIRIN 325 MG: 325 TABLET, COATED ORAL at 08:42

## 2020-09-14 RX ADMIN — CLOPIDOGREL BISULFATE 75 MG: 75 TABLET ORAL at 08:42

## 2020-09-14 RX ADMIN — ENOXAPARIN SODIUM 30 MG: 30 INJECTION SUBCUTANEOUS at 08:42

## 2020-09-14 NOTE — PROGRESS NOTES
[address]   Occupational Therapy Evaluation  Date: 20  Patient Name: Leandra Guillen       Room: 2239/5191-33  MRN: [id number]  Account: [de-identified]   : 1965  (63 y.o.) Gender: male     Discharge Recommendations: The patient's needs are being met with no further Occupational Therapy recommended at discharge. Equipment Needed: No    Referring Practitioner: Dr. Wellington Talamantes  Diagnosis: Acue CVA  Additional Pertinent Hx: per CT 20: Hypodense foci in the left cerebellar hemisphere as described above that may represent areas of acute/subacute ischemia     Past Medical History:  has no past medical history on file. Past Surgical History:   has a past surgical history that includes back surgery. Restrictions  Restrictions/Precautions: Fall Risk  Implants present? : Metal implants(metal fragment in head)  Required Braces or Orthoses?: No     Vitals  Temp: 98.3 °F (36.8 °C)  Pulse: (!) 48  Resp: 18  BP: 132/84  Height: 6' 4\" (193 cm)  Weight: 291 lb 0.1 oz (132 kg)  BMI (Calculated): 35.5  Oxygen Therapy  SpO2: 95 %  Pulse Oximeter Device Mode: Intermittent  Pulse Oximeter Device Location: Finger  O2 Device: None (Room air)  Level of Consciousness: Alert    Subjective  Subjective: \"I had to walk with a wider gait and walking in a straight line was very difficult. ..whenever I get up I get kind of disoriented, kind of lightheaded\" Pt recalls the symptoms that brought him to the hospital.  Overall Orientation Status: Within Normal Limits  Vision  Vision: Impaired  Vision Exceptions: Wears glasses for reading  Hearing  Hearing: Within functional limits  Social/Functional History  Lives With: Other (comment)(mother)  Type of Home: [facility] [address]: One level(basement bedroom; bathroom 2nd floor)  Home Access: Level entry(12 steps from basement to bathroom with L hand rail)  Bathroom Shower/Tub: Walk-in shower, Doors  Bathroom Toilet: Handicap height  Bathroom Equipment: Grab bars around toilet, Hand-held shower  Home Equipment: (no DME)  ADL Assistance: Independent  Homemaking Assistance: Independent  Homemaking Responsibilities: Yes  Ambulation Assistance: Independent  Transfer Assistance: Independent  Active : Yes  Mode of Transportation: SUV, Truck  Occupation: Full time employment  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 Objective  Vision - Basic Assessment  Prior Vision: Wears glasses only for reading   Cognition  Overall Cognitive Status: WFL   Perception  Overall Perceptual Status: WFL  Sensation  Overall Sensation Status: Impaired(reports numbness in LLE from previous surgeries)   ADL  Feeding: Independent  Grooming: Independent, Setup  UE Bathing: Independent, Setup  LE Bathing: Independent, Setup  UE Dressing: Independent, Setup  LE Dressing: Independent, Setup  Toileting: Independent, Setup  Additional Comments: Setup due to hospital environment, however Pt reports and demonstrates independence with all self-care tasks and functional mobility in room.     UE Function  LUE Strength  Gross LUE Strength: WFL  L Hand General: 5/5  LUE Strength Comment: Shoulder 5/5, elbow 5/5     LUE Tone: Normotonic     LUE AROM (degrees)  LUE AROM : WFL     Left Hand AROM (degrees)  Left Hand AROM: WFL  RUE Strength  Gross RUE Strength: WFL  R Hand General: 5/5  RUE Strength Comment: Shoulder 5/5, elbow 5/5      RUE Tone: Normotonic     RUE AROM (degrees)  RUE AROM : WFL     Right Hand AROM (degrees)  Right Hand AROM: WFL    Fine Motor Skills  Coordination  Movements Are Fluid And Coordinated: Yes  Coordination and Movement description: (No deficits noted this date in UE.)     Mobility  Supine to Sit: Independent  Sit to Supine: Independent       Balance  Sitting Balance: Independent  Standing Balance: Independent     Functional Mobility  Functional - Mobility Device: No device  Activity: To/from bathroom, Other(in room and hallway)  Assist Level: Independent  Functional Mobility Comments: independent with mobility in room with no loss of balance noted  Bed mobility  Rolling to Left: Independent  Supine to Sit: Independent  Sit to Supine: Independent  Scooting: Independent  Comment: with head of bed flat     Transfers  Sit to stand: Independent  Stand to sit: Independent  Toilet Transfers  Toilet - Technique: Ambulating  Equipment Used: Standard toilet  Toilet Transfer: Independent  Toilet Transfers Comments: per Pt report, with no device  Functional Activity Tolerance  Functional Activity Tolerance: Tolerates 30 min exercise with multiple rests   Assessment  Assessment: Pt reports and demonstrates independence with all self-care and functional mobility in room. Pt denies any concerns regarding self-care for discharge home. No need for skilled OT services at this time. Prognosis: Good  Decision Making: Low Complexity  REQUIRES OT FOLLOW UP: No  No Skilled OT: Independent with functional mobility, Independent with ADL's, At baseline function, Safe to return home, No OT goals identified  Activity Tolerance: Patient Tolerated treatment well    Functional Outcome Measures  AM-PAC Daily Activity Inpatient   How much help for putting on and taking off regular lower body clothing?: None  How much help for Bathing?: None  How much help for Toileting?: None  How much help for putting on and taking off regular upper body clothing?: None  How much help for taking care of personal grooming?: None  How much help for eating meals?: None  AM-[facility] Inpatient Daily Activity Raw Score: 24  AM-PAC Inpatient ADL T-Scale Score : 57.54  ADL Inpatient CMS 0-100% Score: 0  ADL Inpatient CMS G-Code Modifier : [facility]    Goals  Patient Goals   Patient goals :  To return home    [address] [place] [address] in place: Yes  Type of devices: Call light within reach, Gait belt, Left in bed    Equipment Recommendations  Equipment Needed: No  OT Individual Minutes  Time In: 1009  Time Out: [facility] 7715  Minutes: 17    Electronically signed by Collette Freeman, OT on 9/14/20 at 11:01 AM EDT

## 2020-09-14 NOTE — PROGRESS NOTES
IV and telemetry removed. Pt is a/o x4, discharge instructions reviewed with pt, questions answered, scripts given to pt and work slip. All of pts documented belongings returned to pt. No s/s or c/o distress at time of discharge. Pt taken to ER entrance via wheelchair where his ride was waiting.

## 2020-09-14 NOTE — PROGRESS NOTES
Physical Therapy    Facility/Department: [facility] PROGRESSIVE CARE  Initial Assessment    NAME: Primitivo Dunham  : 1965  MRN: [id number]    Date of Service: 2020    Discharge Recommendations:  Patient would benefit from continued therapy after discharge        Assessment   Body structures, Functions, Activity limitations: Decreased functional mobility   Assessment: Pt safe to ambulate house hold distance without device, pt with slight deficits in high level balance. Will benefit from outpatient physical therapy for challenged balance activities. Treatment Diagnosis: Balance deficits  Specific instructions for Next Treatment: High level balance activities  Prognosis: Excellent  Decision Making: Low Complexity  Exam: Fucntion, balance  REQUIRES PT FOLLOW UP: Yes       Patient Diagnosis(es): The encounter diagnosis was Cerebrovascular accident (CVA), unspecified mechanism ([facility] [address] has no past medical history on file. has a past surgical history that includes back surgery. Restrictions  Restrictions/Precautions  Restrictions/Precautions: Fall Risk  Required Braces or Orthoses?: No  Implants present? : Metal implants(metal fragment in head)  Vision/Hearing  Vision: Impaired  Vision Exceptions: Wears glasses for reading  Hearing: Within functional limits     Subjective  General  Patient assessed for rehabilitation services?: Yes  Additional Pertinent Hx:  Primitivo Dunham is a 63 y.o. yo male admitted on 2020. The patient was at his baseline when he went to sleep on 2020 around 10 PM, he woke up around 5:30 AM and while going to the bathroom, he noticed that his gait imbalance then he has to walk right steppage to prevent falling. He did not have any focal weakness, no dizziness, no diplopia, no nausea vomiting. He came to the hospital as a stroke alert and underwent CT scan of the head that showed left cerebellar hypodensity suggestive for subacute stroke. CT angiogram head neck was negative.   The patient denies having any. History of stroke or TIA. On outpatient basis, he does not take any antiplatelet medications. MRI pending. Referral Date : 09/13/20  Diagnosis: CVA  Follows Commands: Within Functional Limits  Pain Screening  Patient Currently in Pain: Denies  Vital Signs  BP Location: Left Arm  Level of Consciousness: Alert  Patient Currently in Pain: Denies  Oxygen Therapy  O2 Device: None (Room air)       Orientation     Social/Functional History  Social/Functional History  Lives With: Other (comment)(mother)  Type of Home: House  Home Layout: One level(basement bedroom; bathroom 2nd floor)  Home Access: Level entry(12 steps from basement to bathroom with L hand rail)  Bathroom Shower/Tub: Walk-in shower, Doors  Bathroom Toilet: Handicap height  Bathroom Equipment: Grab bars around toilet, Hand-held shower  Home Equipment: (no DME)  ADL Assistance: Independent  Homemaking Assistance: Independent  Homemaking Responsibilities: Yes  Ambulation Assistance: Independent  Transfer Assistance: Independent  Active : Yes  Mode of Transportation: SUV, Truck  Occupation: Full time employment  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 Pt reports he can also walk through other entrance directly to the level of his bed/bath. Cognition        Objective          AROM RLE (degrees)  RLE AROM: WFL  AROM LLE (degrees)  LLE AROM : WFL  Strength RLE  Strength RLE: WFL  Strength LLE  Strength LLE: WFL     Sensation  Overall Sensation Status: Impaired(reports numbness in LLE posterioly from previous surgeries)  Bed mobility  Rolling to Left: Independent  Rolling to Right: Independent  Supine to Sit: Independent  Sit to Supine: Independent  Scooting: Independent  Comment: Pt has been getting up on his own in the room.   Transfers  Sit to Stand:

## 2020-09-14 NOTE — PROGRESS NOTES
Writer called MRI per pt request to see when they would be doing his MRI. Spoke with Afua who stated she would give the MRI tech the message and my number to call me back.

## 2020-09-14 NOTE — PROGRESS NOTES
optic discs; clear; posterior segments  Visual fields intact to confrontation  III,IV,VI -  EOMs full, no afferent defect, no ELI, no ptosis  V     -     Normal facial sensation   VII    -     Normal facial symmetry  VIII   -     Intact hearing   IX,X -     Symmetrical palate  XI    -     Symmetrical shoulder shrug  XII   -     Midline tongue, no atrophy    MOTOR FUNCTION:  significant for good strength of grade 5/5 in bilateral proximal and distal muscle groups of both upper and lower extremities with normal bulk, normal tone and no involuntary movements, no tremor   SENSORY FUNCTION:  Normal touch, normal pin, normal vibration, normal proprioception   CEREBELLAR FUNCTION:  Intact fine motor control over upper limbs   REFLEX FUNCTION:  Symmetric, no perverted reflex, no Babinski sign   STATION and GAIT  normal station; wide-based gait; mild difficulty with tandem gait; no significant ataxia. Able to walk unassisted. Data:    Lab Results:   CBC:   Recent Labs     09/13/20  0849 09/14/20  0529   WBC 8.2 6.8   HGB 16.7 15.9    148*     BMP:    Recent Labs     09/13/20  0849 09/13/20  0853 09/13/20  0919 09/14/20  0529     --   --  138   K 4.1  --   --  4.6     --   --  107   CO2 23  --   --  25   BUN 22*  --   --  17   CREATININE 0.80 0.91  --  0.80   GLUCOSE 109*  --  103  103 113*         Lab Results   Component Value Date    CHOL 172 09/14/2020    LDLCHOLESTEROL 110 09/14/2020    HDL 39 (L) 09/14/2020    TRIG 117 09/14/2020    ALT 35 09/13/2020    AST 22 09/13/2020    INR 1.0 09/13/2020    LABA1C 5.9 09/13/2020     CT head: No acute intracranial abnormalities. Chronic appearing hypodensities in left cerebellum. CTA head and neck: No LVO; codominant vertebral arteries. MRI Brain 9/14/2020: No evidence of acute intracranial abnormality. Remote areas of ischemia in left cerebellar hemisphere.         Impression and Plan: Mr. Lorenza Carrero is a 63 y.o. male with   New onset Lt sided gait ataxia; possible vertebrobasilar TIA; was loaded with aspirin 325 mg, Plavix 300 mg; continue dual antiplatelet therapy with aspirin 81 mg, Plavix 70 mg daily for 3 weeks and then stop Plavix and continue ASA 81 mg daily along with Lipitor 80 mg nightly  PT/OT eval  Fall precautions discussed  Patient needs to follow-up in neurology clinic in 3-4 weeks for hyper coag work-up  Care plan is discussed with the patient and his nurse at bedside. We will follow with you while he is here.           Andrés [place] MD 9/14/2020 3:41 PM

## 2020-09-14 NOTE — DISCHARGE SUMMARY
placed or performed during the hospital encounter of 09/13/20   CBC Auto Differential   Result Value Ref Range    WBC 8.2 3.5 - 11.0 k/uL    RBC 5.35 4.5 - 5.9 m/uL    Hemoglobin 16.7 13.5 - 17.5 g/dL    Hematocrit 46.9 41 - 53 %    MCV 87.6 80 - 100 fL    MCH 31.1 26 - 34 pg    MCHC 35.5 31 - 37 g/dL    RDW 14.2 11.5 - 14.9 %    Platelets 530 805 - 791 k/uL    MPV 9.2 6.0 - 12.0 fL    NRBC Automated NOT REPORTED per 100 WBC    Differential Type NOT REPORTED     Immature Granulocytes NOT REPORTED 0 %    Absolute Immature Granulocyte NOT REPORTED 0.00 - 0.30 k/uL    WBC Morphology NOT REPORTED     RBC Morphology NOT REPORTED     Platelet Estimate NOT REPORTED     Seg Neutrophils 57 36 - 66 %    Lymphocytes 31 24 - 44 %    Monocytes 9 (H) 1 - 7 %    Eosinophils % 2 0 - 4 %    Basophils 1 0 - 2 %    Segs Absolute 4.70 1.3 - 9.1 k/uL    Absolute Lymph # 2.50 1.0 - 4.8 k/uL    Absolute Mono # 0.80 0.1 - 1.3 k/uL    Absolute Eos # 0.20 0.0 - 0.4 k/uL    Basophils Absolute 0.00 0.0 - 0.2 k/uL   Comprehensive Metabolic Panel w/ Reflex to MG   Result Value Ref Range    Glucose 109 (H) 70 - 99 mg/dL    BUN 22 (H) 6 - 20 mg/dL    CREATININE 0.80 0.70 - 1.20 mg/dL    Bun/Cre Ratio NOT REPORTED 9 - 20    Calcium 9.6 8.6 - 10.4 mg/dL    Sodium 139 135 - 144 mmol/L    Potassium 4.1 3.7 - 5.3 mmol/L    Chloride 107 98 - 107 mmol/L    CO2 23 20 - 31 mmol/L    Anion Gap 9 9 - 17 mmol/L    Alkaline Phosphatase 57 40 - 129 U/L    ALT 35 5 - 41 U/L    AST 22 <40 U/L    Total Bilirubin 0.77 0.3 - 1.2 mg/dL    Total Protein 7.3 6.4 - 8.3 g/dL    Alb 4.2 3.5 - 5.2 g/dL    Albumin/Globulin Ratio NOT REPORTED 1.0 - 2.5    GFR Non-African American >60 >60 mL/min    GFR African American >60 >60 mL/min    GFR Comment          GFR Staging NOT REPORTED    Troponin   Result Value Ref Range    Troponin, High Sensitivity 9 0 - 22 ng/L    Troponin T NOT REPORTED <0.03 ng/mL    Troponin Interp NOT REPORTED    Protime-INR   Result Value Ref Range Protime 13.4 11.8 - 14.6 sec    INR 1.0    Hemoglobin A1C   Result Value Ref Range    Hemoglobin A1C 5.9 4.0 - 6.0 %    Estimated Avg Glucose 123 mg/dL   CBC   Result Value Ref Range    WBC 6.8 3.5 - 11.0 k/uL    RBC 5.11 4.5 - 5.9 m/uL    Hemoglobin 15.9 13.5 - 17.5 g/dL    Hematocrit 45.4 41 - 53 %    MCV 88.8 80 - 100 fL    MCH 31.1 26 - 34 pg    MCHC 35.1 31 - 37 g/dL    RDW 14.3 11.5 - 14.9 %    Platelets 485 (L) 357 - 450 k/uL    MPV 8.8 6.0 - 12.0 fL    NRBC Automated NOT REPORTED per 100 WBC   BASIC METABOLIC PANEL   Result Value Ref Range    Glucose 113 (H) 70 - 99 mg/dL    BUN 17 6 - 20 mg/dL    CREATININE 0.80 0.70 - 1.20 mg/dL    Bun/Cre Ratio NOT REPORTED 9 - 20    Calcium 9.1 8.6 - 10.4 mg/dL    Sodium 138 135 - 144 mmol/L    Potassium 4.6 3.7 - 5.3 mmol/L    Chloride 107 98 - 107 mmol/L    CO2 25 20 - 31 mmol/L    Anion Gap 6 (L) 9 - 17 mmol/L    GFR Non-African American >60 >60 mL/min    GFR African American >60 >60 mL/min    GFR Comment          GFR Staging NOT REPORTED    Lipid Profile   Result Value Ref Range    Cholesterol 172 <200 mg/dL    HDL 39 (L) >40 mg/dL    LDL Cholesterol 110 0 - 130 mg/dL    Chol/HDL Ratio 4.4 <5    Triglycerides 117 <150 mg/dL    VLDL NOT REPORTED 1 - 30 mg/dL   TSH with Reflex   Result Value Ref Range    TSH 1.91 0.30 - 5.00 mIU/L   POCT Glucose   Result Value Ref Range    Glucose 103 mg/dL   POCT Glucose   Result Value Ref Range    Glucose 103 mg/dL   Creatinine W/GFR Point of Care   Result Value Ref Range    POC Creatinine 0.91 0.51 - 1.19 mg/dL    GFR Comment >60 >60 mL/min    GFR Non-African American >60 >60 mL/min    GFR Comment         POC Glucose Fingerstick   Result Value Ref Range    POC Glucose 103 75 - 110 mg/dL   EKG 12 Lead   Result Value Ref Range    Ventricular Rate 50 BPM    Atrial Rate 50 BPM    P-R Interval 166 ms    QRS Duration 96 ms    Q-T Interval 468 ms    QTc Calculation (Bazett) 426 ms    P Axis 32 degrees    R Axis -5 degrees    T Axis -9 degrees        {Radiology:    Ct Head Wo Contrast    Result Date: 9/13/2020  EXAMINATION: CT OF THE HEAD WITHOUT CONTRAST  9/13/2020 8:58 am TECHNIQUE: CT of the head was performed without the administration of intravenous contrast. Dose modulation, iterative reconstruction, and/or weight based adjustment of the mA/kV was utilized to reduce the radiation dose to as low as reasonably achievable. COMPARISON: None. HISTORY: ORDERING SYSTEM PROVIDED HISTORY: Ataxia TECHNOLOGIST PROVIDED HISTORY: Ataxia Reason for Exam: ataxia Acuity: Acute Additional signs and symptoms: Pt woke up dizzy this morning; fine when went to bed last night Relevant Medical/Surgical History: no surgery to area of interest FINDINGS: BRAIN/VENTRICLES: There is no acute intracranial hemorrhage, mass effect, or midline shift. There is a hypodense focus in the left cerebellar hemisphere measuring 1.3 x 1.1 cm. There is a smaller hypodense focus in the superior aspect of the left cerebellar hemisphere measuring 6 mm. The ventricular structures are symmetric and unremarkable. ORBITS: The visualized portion of the orbits demonstrate no acute abnormality. SINUSES: The visualized paranasal sinuses and mastoid air cells demonstrate no acute abnormality. SOFT TISSUES/SKULL:  No acute abnormality of the visualized skull or soft tissues. Hypodense foci in the left cerebellar hemisphere as described above that may represent areas of acute/subacute ischemia. Correlation with MRI with diffusion-weighted imaging is recommended for further characterization. Findings were discussed with Dr. David Mckenzie At 9:14 am on 9/13/2020. Cta Head Neck W Contrast    Addendum Date: 9/13/2020    ADDENDUM: 3D reconstructed images were performed on a separate workstation and provided for review.      Result Date: 9/13/2020  EXAMINATION: CTA OF THE HEAD AND NECK WITH CONTRAST 9/13/2020 9:06 am: TECHNIQUE: CTA of the head and neck was performed with the administration of intravenous contrast. Multiplanar reformatted images are provided for review. MIP images are provided for review. Stenosis of the internal carotid arteries measured using NASCET criteria. Dose modulation, iterative reconstruction, and/or weight based adjustment of the mA/kV was utilized to reduce the radiation dose to as low as reasonably achievable. COMPARISON: None. HISTORY: ORDERING SYSTEM PROVIDED HISTORY: Ataxia TECHNOLOGIST PROVIDED HISTORY: Ataxia Reason for Exam: ataxia Acuity: Acute Type of Exam: Initial Additional signs and symptoms: Pt woke up dizzy this morning Relevant Medical/Surgical History: no surgery to area of interest FINDINGS: CTA NECK: AORTIC ARCH/ARCH VESSELS: No dissection or arterial injury. No significant stenosis of the brachiocephalic or subclavian arteries. CAROTID ARTERIES: No dissection, arterial injury, or hemodynamically significant stenosis by NASCET criteria. VERTEBRAL ARTERIES: No dissection, arterial injury, or significant stenosis. SOFT TISSUES: The lung apices are clear. No cervical or superior mediastinal lymphadenopathy. The larynx and pharynx are unremarkable. No acute abnormality of the salivary and thyroid glands. BONES: No acute osseous abnormality. CTA HEAD: ANTERIOR CIRCULATION: No significant stenosis of the intracranial internal carotid, anterior cerebral, or middle cerebral arteries. No aneurysm. POSTERIOR CIRCULATION: No significant stenosis of the vertebral, basilar, or posterior cerebral arteries. No aneurysm. OTHER: No dural venous sinus thrombosis on this non-dedicated study. BRAIN: No mass effect or midline shift. No extra-axial fluid collection. The gray-white differentiation is maintained. Unremarkable CTA of the head and neck. Findings were discussed with Dr. Steve Melgoza At 9:24 am on 9/13/2020.      Mri Brain Wo Contrast    Result Date: 9/14/2020  EXAMINATION: MRI OF THE BRAIN WITHOUT CONTRAST  9/14/2020 1:39 pm TECHNIQUE: Multiplanar multisequence MRI of the brain was performed without the administration of intravenous contrast. COMPARISON: CT brain performed 09/13/2020. HISTORY: ORDERING SYSTEM PROVIDED HISTORY: loss of balance, stroke alert TECHNOLOGIST PROVIDED HISTORY: loss of balance, stroke alert Reason for Exam: Loss of Balance - Stroke alert. Acuity: Acute Type of Exam: Initial Additional signs and symptoms: Per patient - problems with balance and neck pain. Possible stroke Relevant Medical/Surgical History: No surgical hx to area of interest.  Hx - HBP. FINDINGS: INTRACRANIAL STRUCTURES/VENTRICLES: The sellar and suprasellar structures, optic chiasm, corpus callosum, pineal gland, tectum, and midline brainstem structures are unremarkable. The craniocervical junction is unremarkable. There is no acute hemorrhage, mass effect, or midline shift. There is satisfactory overall gray-white matter differentiation. The ventricular structures are symmetric and unremarkable. The infratentorial structures including the cerebellopontine angles and internal auditory canals are unremarkable. There is no abnormal restricted diffusion. There is no abnormal blooming artifact on susceptibility weighted imaging. ORBITS: The visualized portion of the orbits demonstrate no acute abnormality. SINUSES: The visualized paranasal sinuses and mastoid air cells are well aerated. BONES/SOFT TISSUES: The bone marrow signal intensity appears normal. The soft tissues demonstrate no acute abnormality. Remote areas of ischemia in the left cerebellar hemisphere. No evidence for acute intracranial abnormality. Consultations:    Consults:     Final Specialist Recommendations/Findings:   IP CONSULT TO PHARMACY  PHARMACY TO CHANGE BASE FLUIDS  IP CONSULT TO PRIMARY CARE PROVIDER  IP CONSULT TO NEUROLOGY      The patient was seen and examined on day of discharge and this discharge summary is in conjunction with any daily progress note from day of discharge.     Discharge plan: Disposition: Home    Physician Follow Up: With PCP and as specified     Requiring Further Evaluation/Follow Up POST HOSPITALIZATION/Incidental Findings:    Diet: regular     Activity: As tolerated    I  Discharge Medications:      Medication List      You have not been prescribed any medications. Time spent on discharge planning ;          [] less than 30 minutes . [x]   more  than 30 minutes . Ellectronically signed by   Gabino Garcia MD      Thank you Dr. Rosalinda Damian primary care provider on file. for the opportunity to be involved in this patient's care. Please note that this chart was generated using voice recognition Dragon dictation software. Although every effort was made to ensure the accuracy of this automated transcription, some errors in transcription may have occurred.

## 2020-09-14 NOTE — CARE COORDINATION
CASE MANAGEMENT NOTE:    Admission Date:  9/13/2020 Yash Toribio is a 63 y.o.  male    Admitted for : Acute CVA (cerebrovascular accident) ([facility] [address] [I63.9]    Met with:  Patient    PCP:  No PCP , lives in [address] [place] [address] Drive:  Isi Chen      Current Residence/ Living Arrangements:  independently at home             Current Services PTA:  No    Is patient agreeable to VNS: No    Freedom of choice provided:  No    List of [address] provided: NA    VNS chosen:  No    DME:  none    Home Oxygen: No    Nebulizer: No    CPAP/BIPAP: No    Supplier: N/A    Potential Assistance Needed: Yes, probable Pt/OT as outpatient, Patient states  His gait is unsteady and if he needs therapy he will go back home to [place]. SNF needed: No    Freedom of choice and list provided: NA    Pharmacy:  [facility]       Does Patient want to use MEDS to BEDS? Yes    Is patient currently receiving oral anticoagulation therapy? No    Is the Patient an [facility] with Readmission Risk Score greater than 14%? No  If yes, pt needs a follow up appointment made within 7 days. Family Members/Caregivers that pt would like involved in their care:    Yes    If yes, list name here: Mother Briana Redi    Transportation Provider:  Co worker will pick him up             Is patient in Isolation/One on One/Altered Mental Status? No  If yes, skip next question. If no, would they like an I-Pad to  use? No  If yes, call [phone]. Discharge Plan:  9/14/20 - Telma Glover - Patient is from [place] and is here for work. He works on [address] [place] [address] [place] and staying at [employer] in [place]. He states he has an unsteady balance and if he needs PT/OT then he will go back home if he cant work. He advised his co worker would drive him home . Would need scripts for PT/OT for external facility. MRI eulalio ordered for today. Plan is to return home today with no additional needs.  .//pf                Electronically signed by: Dyana Lira RN on 9/14/2020 at 11:19 AM

## 2020-09-14 NOTE — PROGRESS NOTES
Oral   SpO2: 98% 99% 95% 95%   Weight: 291 lb 0.1 oz (132 kg)      Height: 6' 4\" (1.93 m)         Recent Results (from the past 24 hour(s))   CBC    Collection Time: 09/14/20  5:29 AM   Result Value Ref Range    WBC 6.8 3.5 - 11.0 k/uL    RBC 5.11 4.5 - 5.9 m/uL    Hemoglobin 15.9 13.5 - 17.5 g/dL    Hematocrit 45.4 41 - 53 %    MCV 88.8 80 - 100 fL    MCH 31.1 26 - 34 pg    MCHC 35.1 31 - 37 g/dL    RDW 14.3 11.5 - 14.9 %    Platelets 321 (L) 733 - 450 k/uL    MPV 8.8 6.0 - 12.0 fL    NRBC Automated NOT REPORTED per 100 WBC   BASIC METABOLIC PANEL    Collection Time: 09/14/20  5:29 AM   Result Value Ref Range    Glucose 113 (H) 70 - 99 mg/dL    BUN 17 6 - 20 mg/dL    CREATININE 0.80 0.70 - 1.20 mg/dL    Bun/Cre Ratio NOT REPORTED 9 - 20    Calcium 9.1 8.6 - 10.4 mg/dL    Sodium 138 135 - 144 mmol/L    Potassium 4.6 3.7 - 5.3 mmol/L    Chloride 107 98 - 107 mmol/L    CO2 25 20 - 31 mmol/L    Anion Gap 6 (L) 9 - 17 mmol/L    GFR Non-African American >60 >60 mL/min    GFR African American >60 >60 mL/min    GFR Comment          GFR Staging NOT REPORTED    Lipid Profile    Collection Time: 09/14/20  5:29 AM   Result Value Ref Range    Cholesterol 172 <200 mg/dL    HDL 39 (L) >40 mg/dL    LDL Cholesterol 110 0 - 130 mg/dL    Chol/HDL Ratio 4.4 <5    Triglycerides 117 <150 mg/dL    VLDL NOT REPORTED 1 - 30 mg/dL     Recent Labs     09/13/20  0913   POCGLU 103        Ct Head Wo Contrast    Result Date: 9/13/2020  EXAMINATION: CT OF THE HEAD WITHOUT CONTRAST  9/13/2020 8:58 am TECHNIQUE: CT of the head was performed without the administration of intravenous contrast. Dose modulation, iterative reconstruction, and/or weight based adjustment of the mA/kV was utilized to reduce the radiation dose to as low as reasonably achievable. COMPARISON: None.  HISTORY: ORDERING SYSTEM PROVIDED HISTORY: Ataxia TECHNOLOGIST PROVIDED HISTORY: Ataxia Reason for Exam: ataxia Acuity: Acute Additional signs and symptoms: Pt woke up dizzy this morning; fine when went to bed last night Relevant Medical/Surgical History: no surgery to area of interest FINDINGS: BRAIN/VENTRICLES: There is no acute intracranial hemorrhage, mass effect, or midline shift. There is a hypodense focus in the left cerebellar hemisphere measuring 1.3 x 1.1 cm. There is a smaller hypodense focus in the superior aspect of the left cerebellar hemisphere measuring 6 mm. Dorethea Gault is a hypodense focus in the left cerebellar hemisphere measuring 1.3 x 1.1 cm. There is a smaller hypodense focus in the superior aspect of the left cerebellar hemisphere measuring 6 mm. The ventricular structures are symmetric and unremarkable. he ventricular structures are symmetric and unremarkable. ORBITS: The visualized portion of the orbits demonstrate no acute abnormality. SINUSES: The visualized paranasal sinuses and mastoid air cells demonstrate no acute abnormality. SOFT TISSUES/SKULL:  No acute abnormality of the visualized skull or soft tissues. Hypodense foci in the left cerebellar hemisphere as described above that may represent areas of acute/subacute ischemia. Correlation with MRI with diffusion-weighted imaging is recommended for further characterization. Findings were discussed with Dr. Carloz Holt At 9:14 am on 9/13/2020. Cta Head Neck W Contrast    Addendum Date: 9/13/2020    ADDENDUM: 3D reconstructed images were performed on a separate workstation and provided for review. Result Date: 9/13/2020  EXAMINATION: CTA OF THE HEAD AND NECK WITH CONTRAST 9/13/2020 9:06 am: TECHNIQUE: CTA of the head and neck was performed with the administration of intravenous contrast. Multiplanar reformatted images are provided for review. MIP images are provided for review. Stenosis of the internal carotid arteries measured using NASCET criteria.  Dose modulation, iterative reconstruction, and/or weight based adjustment of the mA/kV was utilized to reduce the radiation dose to as low as reasonably change     Social History:  no change    Family History: @no change    Vitals:      I/O (24Hr): Intake/Output Summary (Last 24 hours) at 9/14/2020 1345  Last data filed at 9/14/2020 1056  Gross per 24 hour   Intake 2249 ml   Output 2375 ml   Net -126 ml       Labs:    URINE ANALYSIS: No results found for: LABURIN     CBC:  Lab Results   Component Value Date    WBC 6.8 09/14/2020    HGB 15.9 09/14/2020     09/14/2020        BMP:    Lab Results   Component Value Date     09/14/2020    K 4.6 09/14/2020     09/14/2020    CO2 25 09/14/2020    BUN 17 09/14/2020    CREATININE 0.80 09/14/2020    GLUCOSE 113 09/14/2020      LIVER PROFILE:  Lab Results   Component Value Date    ALT 35 09/13/2020    AST 22 09/13/2020    PROT 7.3 09/13/2020    BILITOT 0.77 09/13/2020    LABALBU 4.2 09/13/2020               Radiology:      Physical Examination:        General appearance:  alert, cooperative and no distress  Mental Status:  oriented to person, place and time and normal affect  Lungs:  clear to auscultation bilaterally, normal effort  Heart:  regular rate and rhythm, no murmur  Abdomen:  soft, nontender, nondistended, normal bowel sounds, no masses, hepatomegaly, splenomegaly  Extremities:  no edema, redness, tenderness in the calves  Skin:  no gross lesions, rashes, induration    Assessment:        Primary Problem  <principal problem not specified>    Active Hospital Problems    Diagnosis Date Noted    Acute CVA (cerebrovascular accident) ([facility] [address] [I63.9] 09/13/2020    Ataxic gait [R26.0]     Essential hypertension [I10]        Plan:        1. CT scan shows abnormality in the cerebellumThere is a hypodense focus in the left cerebellar hemisphere measuring 1.3 x 1.1 cm. There is a smaller hypodense focus in the superior aspect of the left cerebellar hemisphere measuring 6 mm. The ventricular structures are symmetric and unremarkable. 2. Possibility of PFO  3.  We will get a venous duplex scan done to rule out DVT  4. Neuro input noted     Assessment and Recommendations:   Possible left cerebellar subacute ischemic stroke     I agree with continued aspirin along with Plavix 75 mg a day for 3 weeks and then long-term aspirin 81 mg a day thereafter. Continue Lipitor 40 mg p.o. nightly  MRI scan of the brain and 2D echo. If 2D echo is negative, and MRI of the brain confirms ischemic stroke, the patient would benefit from BRY and possibly loop recorder if BRY is negative  PT OT evaluation  We will follow. Thank you for the consult. 5.     Medications:      Allergies:  No Known Allergies    Current Meds:   Scheduled Meds:    sodium chloride flush  10 mL Intravenous 2 times per day    enoxaparin  30 mg Subcutaneous BID    aspirin  325 mg Oral Daily    clopidogrel  75 mg Oral Daily    atorvastatin  40 mg Oral Nightly     Continuous Infusions:    sodium chloride 75 mL/hr at 09/13/20 2203     PRN Meds: perflutren lipid microspheres, sodium chloride flush, sodium chloride flush, acetaminophen       Valma Gilford, MD  9/14/2020  1:45 PM

## 2020-09-14 NOTE — PROGRESS NOTES
Pt refused Venous Doppler, stating no leg pain, no leg swelling. Never saw ordering Dr. Umberto Rincon present.

## 2020-09-14 NOTE — PLAN OF CARE
Patient seen  Neurology has cleared him for discharge  We will cancel the plans for venous duplex scan  Patient will follow with neurologist in 3 weeks